# Patient Record
Sex: FEMALE | Race: WHITE | Employment: UNEMPLOYED | URBAN - NONMETROPOLITAN AREA
[De-identification: names, ages, dates, MRNs, and addresses within clinical notes are randomized per-mention and may not be internally consistent; named-entity substitution may affect disease eponyms.]

---

## 2023-05-09 ENCOUNTER — OFFICE VISIT (OUTPATIENT)
Dept: FAMILY MEDICINE CLINIC | Age: 18
End: 2023-05-09
Payer: COMMERCIAL

## 2023-05-09 VITALS
OXYGEN SATURATION: 98 % | HEIGHT: 68 IN | WEIGHT: 192 LBS | RESPIRATION RATE: 20 BRPM | HEART RATE: 84 BPM | TEMPERATURE: 98.2 F | BODY MASS INDEX: 29.1 KG/M2 | SYSTOLIC BLOOD PRESSURE: 128 MMHG | DIASTOLIC BLOOD PRESSURE: 74 MMHG

## 2023-05-09 DIAGNOSIS — A60.1 HERPES SIMPLEX INFECTION OF PERIANAL SKIN: ICD-10-CM

## 2023-05-09 DIAGNOSIS — A60.1 HERPES SIMPLEX INFECTION OF PERIANAL SKIN: Primary | ICD-10-CM

## 2023-05-09 PROCEDURE — 1036F TOBACCO NON-USER: CPT | Performed by: PHYSICIAN ASSISTANT

## 2023-05-09 PROCEDURE — G8419 CALC BMI OUT NRM PARAM NOF/U: HCPCS | Performed by: PHYSICIAN ASSISTANT

## 2023-05-09 PROCEDURE — 99203 OFFICE O/P NEW LOW 30 MIN: CPT | Performed by: PHYSICIAN ASSISTANT

## 2023-05-09 PROCEDURE — G8427 DOCREV CUR MEDS BY ELIG CLIN: HCPCS | Performed by: PHYSICIAN ASSISTANT

## 2023-05-09 RX ORDER — CEPHALEXIN 500 MG/1
CAPSULE ORAL
COMMUNITY
Start: 2023-05-06

## 2023-05-09 RX ORDER — VALACYCLOVIR HYDROCHLORIDE 1 G/1
1000 TABLET, FILM COATED ORAL 3 TIMES DAILY
Qty: 21 TABLET | Refills: 0 | Status: SHIPPED | OUTPATIENT
Start: 2023-05-09 | End: 2023-05-16

## 2023-05-09 RX ORDER — ALBUTEROL SULFATE 90 UG/1
2 AEROSOL, METERED RESPIRATORY (INHALATION) EVERY 4 HOURS PRN
COMMUNITY
Start: 2022-07-05

## 2023-05-09 ASSESSMENT — ENCOUNTER SYMPTOMS
BACK PAIN: 0
COUGH: 0
NAUSEA: 0
ABDOMINAL PAIN: 0
SHORTNESS OF BREATH: 0
DIARRHEA: 0
PHOTOPHOBIA: 0
SORE THROAT: 0
VOMITING: 0

## 2023-05-09 NOTE — PROGRESS NOTES
is in various stages of scabbing. There is no secondary infection. It does appear consistent with genital herpes. Musculoskeletal:         General: Normal range of motion. Cervical back: Normal range of motion. No rigidity. Lymphadenopathy:      Cervical: No cervical adenopathy. Skin:     General: Skin is warm and dry. Findings: Rash present. Neurological:      General: No focal deficit present. Mental Status: She is alert and oriented to person, place, and time. Mental status is at baseline. Psychiatric:         Mood and Affect: Mood normal.         Behavior: Behavior normal.         Thought Content: Thought content normal.         Judgment: Judgment normal.         Testing:           Medical Decision Making:     Patient upon arrival did not appear toxic or lethargic. Vital signs were reviewed. Past medical history reviewed. Allergies reviewed. Medications reviewed. Patient is presenting with the above complaint of rash. Differential diagnosis was discussed with the patient although her signs and symptoms are most consistent with genital herpes. Patient will be given a prescription for valacyclovir. She was educated on transmission and the need to wear condoms with future partners. I recommended close follow-up with her gynecologist.  I did obtain culture with pending results. Patient understands a plan is agreeable. Clinical Impression:   Kevin Bhatti was seen today for rash. Diagnoses and all orders for this visit:    Herpes simplex infection of perianal skin  -     HERPES SIMPLEX 1 & 2 MOLECULAR; Future    Other orders  -     valACYclovir (VALTREX) 1 g tablet; Take 1 tablet by mouth 3 times daily for 7 days        The patient is to call for any concerns or return if any of the signs or symptoms worsen. The patient is to follow-up with PCP in the next 2-3 days for repeat evaluation repeat assessment or go directly to the emergency department.      SIGNATURE:

## 2023-05-11 LAB
HSV2 DNA SPEC QL NAA+PROBE: ABNORMAL
ORGANISM: ABNORMAL

## 2024-01-11 ENCOUNTER — OFFICE VISIT (OUTPATIENT)
Dept: OBSTETRICS AND GYNECOLOGY | Facility: CLINIC | Age: 19
End: 2024-01-11
Payer: COMMERCIAL

## 2024-01-11 ENCOUNTER — LAB (OUTPATIENT)
Dept: LAB | Facility: LAB | Age: 19
End: 2024-01-11
Payer: COMMERCIAL

## 2024-01-11 VITALS
DIASTOLIC BLOOD PRESSURE: 60 MMHG | BODY MASS INDEX: 27 KG/M2 | HEIGHT: 67 IN | WEIGHT: 172 LBS | SYSTOLIC BLOOD PRESSURE: 120 MMHG

## 2024-01-11 DIAGNOSIS — N93.9 ABNORMAL UTERINE BLEEDING: ICD-10-CM

## 2024-01-11 DIAGNOSIS — N93.9 ABNORMAL UTERINE BLEEDING: Primary | ICD-10-CM

## 2024-01-11 LAB
APTT PPP: 36 SECONDS (ref 27–38)
ERYTHROCYTE [DISTWIDTH] IN BLOOD BY AUTOMATED COUNT: 11.5 % (ref 11.5–14.5)
FIBRINOGEN PPP-MCNC: 154 MG/DL (ref 200–400)
HCT VFR BLD AUTO: 38.9 % (ref 36–46)
HGB BLD-MCNC: 13.1 G/DL (ref 12–16)
INR PPP: 1.1 (ref 0.9–1.1)
MCH RBC QN AUTO: 31 PG (ref 26–34)
MCHC RBC AUTO-ENTMCNC: 33.7 G/DL (ref 32–36)
MCV RBC AUTO: 92 FL (ref 80–100)
NRBC BLD-RTO: 0 /100 WBCS (ref 0–0)
PLATELET # BLD AUTO: 283 X10*3/UL (ref 150–450)
PROTHROMBIN TIME: 12.3 SECONDS (ref 9.8–12.8)
RBC # BLD AUTO: 4.23 X10*6/UL (ref 4–5.2)
REFLEX ADDED, ANEMIA PANEL: NORMAL
WBC # BLD AUTO: 6 X10*3/UL (ref 4.4–11.3)

## 2024-01-11 PROCEDURE — 85246 CLOT FACTOR VIII VW ANTIGEN: CPT

## 2024-01-11 PROCEDURE — 36415 COLL VENOUS BLD VENIPUNCTURE: CPT

## 2024-01-11 PROCEDURE — 85240 CLOT FACTOR VIII AHG 1 STAGE: CPT

## 2024-01-11 PROCEDURE — 85027 COMPLETE CBC AUTOMATED: CPT

## 2024-01-11 PROCEDURE — 1036F TOBACCO NON-USER: CPT | Performed by: STUDENT IN AN ORGANIZED HEALTH CARE EDUCATION/TRAINING PROGRAM

## 2024-01-11 PROCEDURE — 99204 OFFICE O/P NEW MOD 45 MIN: CPT | Performed by: STUDENT IN AN ORGANIZED HEALTH CARE EDUCATION/TRAINING PROGRAM

## 2024-01-11 PROCEDURE — 85384 FIBRINOGEN ACTIVITY: CPT

## 2024-01-11 PROCEDURE — 85730 THROMBOPLASTIN TIME PARTIAL: CPT

## 2024-01-11 PROCEDURE — 85610 PROTHROMBIN TIME: CPT

## 2024-01-11 PROCEDURE — 85245 CLOT FACTOR VIII VW RISTOCTN: CPT

## 2024-01-11 RX ORDER — ALBUTEROL SULFATE 90 UG/1
2 AEROSOL, METERED RESPIRATORY (INHALATION) EVERY 4 HOURS PRN
COMMUNITY
Start: 2017-07-27 | End: 2024-01-30 | Stop reason: SDUPTHER

## 2024-01-11 RX ORDER — TRANEXAMIC ACID 650 MG/1
1300 TABLET ORAL 3 TIMES DAILY
Qty: 30 TABLET | Refills: 0 | Status: SHIPPED | OUTPATIENT
Start: 2024-01-11 | End: 2024-01-16

## 2024-01-11 RX ORDER — LEVONORGESTREL 52 MG/1
INTRAUTERINE DEVICE INTRAUTERINE ONCE
COMMUNITY

## 2024-01-11 RX ORDER — NORGESTIMATE AND ETHINYL ESTRADIOL 0.25-0.035
1 KIT ORAL DAILY
Qty: 112 TABLET | Refills: 3 | Status: SHIPPED | OUTPATIENT
Start: 2024-01-11 | End: 2025-01-10

## 2024-01-11 ASSESSMENT — PAIN SCALES - GENERAL: PAINLEVEL: 6

## 2024-01-11 NOTE — PROGRESS NOTES
Subjective   Patient ID: Che Adams is a 18 y.o. female who presents for New Patient Visit (Pt states she has been bleeding since May 2023 on Liletta ).    First period June 2016, heavy, lasting many days, Referred to gynecologist by PCP for management. Tried 3-4 different pills over a couple years with minimal control of bleeding. Had Liletta placed in June 2022, no period for 1 year. On May 1, 2023, went to ED for bleeding and abdominal pain. Has been bleeding since. Changing pad/tampon every 2 hours.    Has never been worked up for coagulopathies. Reports easy bruising of legs. History of wisdom tooth extraction, no issues. No other surgeries.     Objective   Physical Exam  Constitutional:       General: She is not in acute distress.     Appearance: Normal appearance.   HENT:      Head: Normocephalic.   Cardiovascular:      Rate and Rhythm: Normal rate.   Pulmonary:      Effort: Pulmonary effort is normal. No respiratory distress.   Skin:     General: Skin is warm and dry.   Neurological:      Mental Status: She is alert and oriented to person, place, and time.   Psychiatric:         Mood and Affect: Mood normal.         Behavior: Behavior normal.         Thought Content: Thought content normal.         Judgment: Judgment normal.     Bedside Ultrasound: Anteverted uterus with IUD positioned at the fundus.    Assessment/Plan   Problem List Items Addressed This Visit             ICD-10-CM    Abnormal uterine bleeding - Primary N93.9     - Given extensive history of heavy menstrual bleeding difficult to control since onset of menses, recommend testing for coagulopathy. Below labs ordered. If abnormal, will refer to Hematology.  - IUD in place, discussed management options. Recommend proceeding with Lysteda 1300mg TID x 5d for immediate stabilization, and continuous norgestimate-estradiol (Sprintec) for ongoing control. Patient amenable to plan of care.  - To contact clinic if still bleeding heavily in 3-4  weeks, otherwise return to clinic in 3 months for follow up assessment         Relevant Medications    tranexamic acid (Lysteda) 650 mg tablet tablet    norgestimate-ethinyl estradioL (Ortho-Cyclen) 0.25-35 mg-mcg tablet    Other Relevant Orders    CBC Anemia Panel With Reflex,Pregnancy    Coagulation Screen    Fibrinogen    Von Willebrand Antigen    von Willebrand Factor, Activity (Ristocetin Cofactor)    Factor 8 Activity            Danis Zaidi MD 01/11/24 11:03 AM

## 2024-01-12 LAB
FACT VIII ACT/NOR PPP: 112 % (ref 55–180)
VWF AG ACT/NOR PPP IA: 107 % (ref 50–220)

## 2024-01-14 LAB — VWF:RCO ACT/NOR PPP PL AGG: 124 % (ref 51–215)

## 2024-01-25 PROBLEM — M67.40 GANGLION CYST: Status: ACTIVE | Noted: 2024-01-25

## 2024-01-25 PROBLEM — R10.9 ABDOMINAL PAIN: Status: ACTIVE | Noted: 2023-05-06

## 2024-01-25 PROBLEM — F41.1 GAD (GENERALIZED ANXIETY DISORDER): Status: ACTIVE | Noted: 2022-06-30

## 2024-01-25 PROBLEM — Z86.19 HISTORY OF COLD SORES: Status: ACTIVE | Noted: 2023-05-17

## 2024-01-25 PROBLEM — Z91.410 HISTORY OF RAPE IN ADULTHOOD: Status: ACTIVE | Noted: 2022-06-30

## 2024-01-25 PROBLEM — M41.9 MILD SCOLIOSIS: Status: ACTIVE | Noted: 2024-01-25

## 2024-01-25 PROBLEM — L94.0 LOCALIZED MORPHEA: Status: ACTIVE | Noted: 2019-02-04

## 2024-01-25 PROBLEM — K64.9 HEMORRHOIDS: Status: ACTIVE | Noted: 2024-01-25

## 2024-01-25 PROBLEM — E66.3 OVERWEIGHT (BMI 25.0-29.9): Status: ACTIVE | Noted: 2021-06-11

## 2024-01-25 PROBLEM — J30.9 ALLERGIC RHINITIS: Status: ACTIVE | Noted: 2024-01-25

## 2024-01-25 PROBLEM — R53.83 FATIGUE: Status: ACTIVE | Noted: 2022-10-18

## 2024-01-25 PROBLEM — F32.A ANXIETY AND DEPRESSION: Status: ACTIVE | Noted: 2022-10-18

## 2024-01-25 PROBLEM — F34.1 PERSISTENT DEPRESSIVE DISORDER WITH ANXIOUS DISTRESS, CURRENTLY MODERATE: Status: ACTIVE | Noted: 2022-08-29

## 2024-01-25 PROBLEM — R21 RASH AND OTHER NONSPECIFIC SKIN ERUPTION: Status: ACTIVE | Noted: 2023-05-06

## 2024-01-25 PROBLEM — F41.9 ANXIETY AND DEPRESSION: Status: ACTIVE | Noted: 2022-10-18

## 2024-01-25 PROBLEM — L50.2 URTICARIA DUE TO COLD: Status: ACTIVE | Noted: 2019-06-03

## 2024-01-25 PROBLEM — J45.20 MILD INTERMITTENT ASTHMA (HHS-HCC): Status: ACTIVE | Noted: 2022-07-26

## 2024-01-25 PROBLEM — N39.0 URINARY TRACT INFECTIOUS DISEASE: Status: ACTIVE | Noted: 2024-01-25

## 2024-01-30 ENCOUNTER — OFFICE VISIT (OUTPATIENT)
Dept: PRIMARY CARE | Facility: CLINIC | Age: 19
End: 2024-01-30
Payer: COMMERCIAL

## 2024-01-30 VITALS
DIASTOLIC BLOOD PRESSURE: 60 MMHG | BODY MASS INDEX: 27 KG/M2 | SYSTOLIC BLOOD PRESSURE: 120 MMHG | WEIGHT: 172 LBS | HEIGHT: 67 IN | OXYGEN SATURATION: 98 % | HEART RATE: 70 BPM

## 2024-01-30 DIAGNOSIS — J45.20 MILD INTERMITTENT ASTHMA WITHOUT COMPLICATION (HHS-HCC): ICD-10-CM

## 2024-01-30 DIAGNOSIS — R59.0 ENLARGED LYMPH NODES IN ARMPIT: Primary | ICD-10-CM

## 2024-01-30 PROCEDURE — 1036F TOBACCO NON-USER: CPT | Performed by: NURSE PRACTITIONER

## 2024-01-30 PROCEDURE — 99204 OFFICE O/P NEW MOD 45 MIN: CPT | Performed by: NURSE PRACTITIONER

## 2024-01-30 RX ORDER — ALBUTEROL SULFATE 90 UG/1
2 AEROSOL, METERED RESPIRATORY (INHALATION) EVERY 4 HOURS PRN
Qty: 18 G | Refills: 3 | Status: SHIPPED | OUTPATIENT
Start: 2024-01-30

## 2024-01-30 ASSESSMENT — PATIENT HEALTH QUESTIONNAIRE - PHQ9
2. FEELING DOWN, DEPRESSED OR HOPELESS: NOT AT ALL
SUM OF ALL RESPONSES TO PHQ9 QUESTIONS 1 & 2: 0
1. LITTLE INTEREST OR PLEASURE IN DOING THINGS: NOT AT ALL

## 2024-01-30 NOTE — PROGRESS NOTES
"Subjective   Patient ID: Che Adams is a 18 y.o. female who presents for New pt to establish c/o armpit lumps.    HPI   Pt states that when she was 8 yrs old her mom noticed that she had a lump in her left armpit  Pediatrician evaluated     The other day her sister looked at her armpit and thought it was \"swollen\"  She denies pain to area  No recent illnesses  Mom is concerned    H/o mild intermittent asthma:   Mostly in winter  Med: Albuterol inhaler    Review of Systems    Objective   /60   Pulse 70   Ht 1.702 m (5' 7\")   Wt 78 kg (172 lb)   LMP  (LMP Unknown) Comment: IUD  SpO2 98%   BMI 26.94 kg/m²     Physical Exam    Gen.: Alert and oriented x3 female in no acute distress.  HEENT:  Neck without adenopathy   Breasts: Normal appearance, no nipple discharge. Palpation of breast normal. No palpable mass and no axillary lymphadenopathy of right.  Left axillary region is slightly more pronounced than right. No pain with palpation.  Psych: normal affect.  No suicidal ideation.  Good judgment and insight.    Reviewed Medical History form completed by patient    Assessment/Plan   New Patient    1. Enlarged lymph nodes in armpit  - BI US breast limited left; Future  She will be made aware of results.   Further orders will be determined    2. Mild intermittent asthma without complication    - albuterol (ProAir HFA) 90 mcg/actuation inhaler; Inhale 2 puffs every 4 hours if needed for shortness of breath or wheezing.  Dispense: 18 g; Refill: 3    Follow up: CPE    Medications refills will be completed as discussed.     Any labs or testing that is ordered will be reviewed and the results will be in your chart .   You can review these via  [email or web address].     Prescriptions will not be filled unless you are compliant with your follow-up appointments or have a follow-up appointment scheduled as per the instruction of your provider. Refills for medications should be requested at the time of your office visit. "     Please allow one week for refill requests to be completed.     Contact office with any questions or concerns.   Preferred communication is via  [email or web address]  Please contact [email or web address] if having issues with  MyChart    [facility]  [address], [address]  [place] [address]  Phone: [phone]    **Charting was completed using voice recognition technology and may include unintended errors**

## 2024-02-02 PROBLEM — R59.0 ENLARGED LYMPH NODES IN ARMPIT: Status: ACTIVE | Noted: 2024-02-02

## 2024-02-12 DIAGNOSIS — J35.8 TONSILLOLITH: Primary | ICD-10-CM

## 2024-02-12 RX ORDER — CLARITHROMYCIN 500 MG/1
500 TABLET, FILM COATED ORAL 2 TIMES DAILY
Qty: 20 TABLET | Refills: 0 | Status: SHIPPED | OUTPATIENT
Start: 2024-02-12 | End: 2024-04-11 | Stop reason: WASHOUT

## 2024-02-21 ENCOUNTER — OFFICE VISIT (OUTPATIENT)
Dept: OTOLARYNGOLOGY | Facility: CLINIC | Age: 19
End: 2024-02-21
Payer: COMMERCIAL

## 2024-02-21 VITALS
TEMPERATURE: 97.3 F | SYSTOLIC BLOOD PRESSURE: 117 MMHG | BODY MASS INDEX: 26.18 KG/M2 | HEIGHT: 67 IN | DIASTOLIC BLOOD PRESSURE: 74 MMHG | WEIGHT: 166.8 LBS

## 2024-02-21 DIAGNOSIS — J35.8 TONSIL STONE: ICD-10-CM

## 2024-02-21 PROCEDURE — 99203 OFFICE O/P NEW LOW 30 MIN: CPT | Performed by: OTOLARYNGOLOGY

## 2024-02-21 PROCEDURE — 1036F TOBACCO NON-USER: CPT | Performed by: OTOLARYNGOLOGY

## 2024-02-21 NOTE — PROGRESS NOTES
Impression:  1. Tonsil stone  Referral to ENT            RECOMMENDATIONS/PLAN :  I reassured the patient there is no evidence of any active tonsil stones and her tonsils are back down to normal size.  She will continue to drink plenty of water and do some salt water gargles after she eats to avoid any future tonsil stones.  At this point I do not recommend any surgical intervention.  She will follow-up with her family physician if she comes down with acute tonsillitis in the future.      **This electronic medical record note was created with the use of voice recognition software.  Despite proofreading, typographical or grammatical errors may be present that could affect meaning of content **    Subjective   Patient ID:     Che Adams is a 18 y.o. female who presents to the office today after she previously had tonsil stones.  She also states that her tonsils were swollen and she did finish antibiotics and she is doing much better now.  She denies having 7 infections in 1 year or 5 each year for 2 years in a row.  She denies any obstructive issues.  She does complain that her nose is somewhat dry and occasionally she does have difficulty breathing through her nose.  No history of sinus infections fever or chills.    ROS:  A detailed 12 system review of systems is noted on the intake form has been reviewed with the patient with details noted in the HPI and scanned into the patient's medical record.    Objective     Past Medical History:   Diagnosis Date    Achilles tendinitis, unspecified leg 06/16/2015    Achilles tendonitis    Acute pharyngitis, unspecified 12/17/2016    Sore throat    Acute upper respiratory infection, unspecified 12/01/2015    Acute URI    Allergic     Anxiety     Asthma     Encounter for immunization 12/30/2014    Need for HPV vaccine    GERD (gastroesophageal reflux disease)     Myringotomy tube(s) status     Patent pressure equalization (PE) tubes, bilateral    Other disorders affecting  eyelid function     Excessive blinking    Otitis media, unspecified, bilateral     BOM (bilateral otitis media)    Otitis media, unspecified, right ear 05/10/2017    Right acute otitis media    Personal history of other diseases of the nervous system and sense organs     Personal history of otitis media    Personal history of other diseases of the respiratory system     History of bronchiolitis    Personal history of other diseases of the respiratory system     History of upper respiratory infection    Personal history of other diseases of the respiratory system 11/08/2014    History of streptococcal pharyngitis    Personal history of other diseases of the respiratory system 11/08/2014    History of pharyngitis    Personal history of other diseases of the respiratory system 12/19/2016    History of streptococcal pharyngitis    Personal history of other specified conditions     History of wheezing        Past Surgical History:   Procedure Laterality Date    ADENOIDECTOMY      TYMPANOSTOMY TUBE PLACEMENT  09/02/2014    Ear Pressure Equalization Tube, Insertion, Bilaterally    WISDOM TOOTH EXTRACTION          Allergies   Allergen Reactions    Kiwi Hives    Penicillins Hives          Current Outpatient Medications:     cetirizine (ZyrTEC) 10 mg capsule, Take by mouth early in the morning.., Disp: , Rfl:     levonorgestreL (Liletta) 20.4 mcg/24 hrs (8 yrs) 52 mg intrauterine device, by intrauterine route 1 time., Disp: , Rfl:     norgestimate-ethinyl estradioL (Ortho-Cyclen) 0.25-35 mg-mcg tablet, Take 1 tablet by mouth once daily. Skip placebo week, take active pill continuously., Disp: 112 tablet, Rfl: 3    albuterol (ProAir HFA) 90 mcg/actuation inhaler, Inhale 2 puffs every 4 hours if needed for shortness of breath or wheezing., Disp: 18 g, Rfl: 3    clarithromycin (Biaxin) 500 mg tablet, Take 1 tablet (500 mg) by mouth 2 times a day., Disp: 20 tablet, Rfl: 0     Tobacco Use: Low Risk  (2/21/2024)    Patient History  "    Smoking Tobacco Use: Never     Smokeless Tobacco Use: Never     Passive Exposure: Not on file        Alcohol Use: Not on file        Social History     Substance and Sexual Activity   Drug Use Never        Physical Exam:  Visit Vitals  /74   Temp 36.3 °C (97.3 °F) (Temporal)   Ht 1.702 m (5' 7\")   Wt 75.7 kg (166 lb 12.8 oz)   BMI 26.12 kg/m²   OB Status Having periods   Smoking Status Never   BSA 1.89 m²      General: Patient is alert, oriented, cooperative in no apparent distress.  Head: Normocephalic, atraumatic.  Eyes: PERRL, EOMI, Conjunctiva is clear. No nystagmus.  Ears: Right Ear-- Pinna is normal.  External auditory canal is patent. Tympanic membrane is [intact, translucent and has good mobility with my pneumatic otoscope. No effusion].  Mastoid is nontender.  Left ear-- Pinna is normal.  External auditory canal is patent. Tympanic membrane is [intact, translucent and has good mobility with my pneumatic otoscope.  No effusion].  Mastoid is nontender.  Nose: Septum is mildly deviated to the right.  No septal perforation or lesions. No septal hematoma/ seroma.  No signs of bleeding.  Inferior turbinates are mildly swollen.   No evidence of intranasal polyps.  No infectious drainage.  Throat:  Floor of mouth is clear, no masses.  Tongue appears normal, no lesions or masses. Gums, gingiva, buccal mucosa appear pink and moist, no lesions. Teeth are in good repair.  No obvious dental infections.  Peritonsillar regions appear symmetric without swelling.  Hard and soft palate appear normal, no obvious cleft. Uvula is midline.  Left Tonsil --+1, no exudates.  No active tonsil stones  Right Tonsil --+1, no exudates.  No active tonsil stones  Oropharynx: No lesions. Retropharyngeal wall is flat.  No postnasal drip.  Neck: Supple,  no lymphadenopathy.  No masses.  Salivary Glands: Symmetric bilaterally.  No palpable masses.  No evidence of acute infection or salivary stones  Neurologic: Cranial Nerves 2-12 " are grossly intact without focal deficits. Cerebellar function testing is normal.     Results:   []    Procedure:   []    Taye Goldberg, DO

## 2024-03-12 ENCOUNTER — APPOINTMENT (OUTPATIENT)
Dept: OTOLARYNGOLOGY | Facility: CLINIC | Age: 19
End: 2024-03-12
Payer: COMMERCIAL

## 2024-04-11 ENCOUNTER — OFFICE VISIT (OUTPATIENT)
Dept: OBSTETRICS AND GYNECOLOGY | Facility: CLINIC | Age: 19
End: 2024-04-11
Payer: COMMERCIAL

## 2024-04-11 VITALS
HEIGHT: 67 IN | SYSTOLIC BLOOD PRESSURE: 102 MMHG | WEIGHT: 166 LBS | BODY MASS INDEX: 26.06 KG/M2 | DIASTOLIC BLOOD PRESSURE: 64 MMHG

## 2024-04-11 DIAGNOSIS — N93.9 ABNORMAL UTERINE BLEEDING: Primary | ICD-10-CM

## 2024-04-11 PROCEDURE — 99213 OFFICE O/P EST LOW 20 MIN: CPT | Performed by: STUDENT IN AN ORGANIZED HEALTH CARE EDUCATION/TRAINING PROGRAM

## 2024-04-11 PROCEDURE — 1036F TOBACCO NON-USER: CPT | Performed by: STUDENT IN AN ORGANIZED HEALTH CARE EDUCATION/TRAINING PROGRAM

## 2024-04-11 ASSESSMENT — PAIN SCALES - GENERAL: PAINLEVEL: 0-NO PAIN

## 2024-04-11 NOTE — PROGRESS NOTES
Subjective   Patient ID: Che Adams is a 19 y.o. female who presents for Follow-up (NO Bleeding simnce 3/3/2024).    Reports no vaginal bleeding since March 3, very happy with results. Reports 35lb weight loss since October without significant changes in habits; this is not bothersome to her. No other questions or concerns.     Objective   Physical Exam  Constitutional:       General: She is not in acute distress.     Appearance: Normal appearance.   HENT:      Head: Normocephalic.   Cardiovascular:      Rate and Rhythm: Normal rate.   Pulmonary:      Effort: Pulmonary effort is normal. No respiratory distress.   Skin:     General: Skin is warm and dry.   Neurological:      Mental Status: She is alert and oriented to person, place, and time.   Psychiatric:         Mood and Affect: Mood normal.         Behavior: Behavior normal.         Thought Content: Thought content normal.         Judgment: Judgment normal.         Assessment/Plan   Problem List Items Addressed This Visit             ICD-10-CM    Abnormal uterine bleeding - Primary N93.9     Reviewed coagulopathy labs - nothing requiring follow up  Currently no bleeding, pleased with results  Follow up 1 year or sooner PRN                 Danis Zaidi MD 04/11/24 8:47 AM

## 2024-04-11 NOTE — ASSESSMENT & PLAN NOTE
Reviewed coagulopathy labs - nothing requiring follow up  Currently no bleeding, pleased with results  Follow up 1 year or sooner PRN

## 2024-09-06 ENCOUNTER — PATIENT MESSAGE (OUTPATIENT)
Dept: PRIMARY CARE | Facility: CLINIC | Age: 19
End: 2024-09-06
Payer: COMMERCIAL

## 2024-09-06 DIAGNOSIS — R11.2 NAUSEA AND VOMITING, UNSPECIFIED VOMITING TYPE: Primary | ICD-10-CM

## 2024-09-09 DIAGNOSIS — N93.9 ABNORMAL UTERINE BLEEDING: ICD-10-CM

## 2024-09-10 RX ORDER — NORGESTIMATE AND ETHINYL ESTRADIOL 0.25-0.035
1 KIT ORAL DAILY
Qty: 28 TABLET | Refills: 0 | Status: SHIPPED | OUTPATIENT
Start: 2024-09-10 | End: 2025-09-10

## 2024-10-31 ENCOUNTER — OFFICE VISIT (OUTPATIENT)
Dept: GASTROENTEROLOGY | Facility: CLINIC | Age: 19
End: 2024-10-31
Payer: COMMERCIAL

## 2024-10-31 VITALS
WEIGHT: 165 LBS | HEART RATE: 80 BPM | BODY MASS INDEX: 25.01 KG/M2 | TEMPERATURE: 98.4 F | SYSTOLIC BLOOD PRESSURE: 120 MMHG | DIASTOLIC BLOOD PRESSURE: 76 MMHG | HEIGHT: 68 IN

## 2024-10-31 DIAGNOSIS — R13.19 ESOPHAGEAL DYSPHAGIA: Primary | ICD-10-CM

## 2024-10-31 DIAGNOSIS — K59.09 CHRONIC CONSTIPATION: ICD-10-CM

## 2024-10-31 DIAGNOSIS — R11.2 NAUSEA AND VOMITING, UNSPECIFIED VOMITING TYPE: ICD-10-CM

## 2024-10-31 PROCEDURE — 99203 OFFICE O/P NEW LOW 30 MIN: CPT | Performed by: NURSE PRACTITIONER

## 2024-10-31 PROCEDURE — 99213 OFFICE O/P EST LOW 20 MIN: CPT | Performed by: NURSE PRACTITIONER

## 2024-10-31 PROCEDURE — 1036F TOBACCO NON-USER: CPT | Performed by: NURSE PRACTITIONER

## 2024-10-31 PROCEDURE — 3008F BODY MASS INDEX DOCD: CPT | Performed by: NURSE PRACTITIONER

## 2024-10-31 RX ORDER — OMEPRAZOLE 40 MG/1
40 CAPSULE, DELAYED RELEASE ORAL DAILY
Qty: 30 CAPSULE | Refills: 11 | Status: SHIPPED | OUTPATIENT
Start: 2024-10-31 | End: 2025-10-31

## 2024-10-31 RX ORDER — WHEAT DEXTRIN 5 G/7.4 G
POWDER (GRAM) ORAL
Qty: 248 G | Refills: 1 | Status: SHIPPED | OUTPATIENT
Start: 2024-10-31

## 2024-10-31 ASSESSMENT — ENCOUNTER SYMPTOMS
FEVER: 0
ROS GI COMMENTS: SEE HPI
HEMATOLOGIC/LYMPHATIC NEGATIVE: 1
WHEEZING: 0
EYES NEGATIVE: 1
STRIDOR: 0
RESPIRATORY NEGATIVE: 1
ALLERGIC/IMMUNOLOGIC NEGATIVE: 1
NEUROLOGICAL NEGATIVE: 1
CARDIOVASCULAR NEGATIVE: 1
MUSCULOSKELETAL NEGATIVE: 1
CHILLS: 0
FATIGUE: 0
DIAPHORESIS: 0
ENDOCRINE NEGATIVE: 1
DIFFICULTY URINATING: 0
SHORTNESS OF BREATH: 0
PSYCHIATRIC NEGATIVE: 1
COUGH: 0
CHEST TIGHTNESS: 0
APNEA: 0

## 2024-11-07 ENCOUNTER — APPOINTMENT (OUTPATIENT)
Dept: PRIMARY CARE | Facility: CLINIC | Age: 19
End: 2024-11-07
Payer: COMMERCIAL

## 2024-11-11 ENCOUNTER — APPOINTMENT (OUTPATIENT)
Dept: PRIMARY CARE | Facility: CLINIC | Age: 19
End: 2024-11-11
Payer: COMMERCIAL

## 2024-11-11 ENCOUNTER — LAB (OUTPATIENT)
Dept: LAB | Facility: LAB | Age: 19
End: 2024-11-11
Payer: COMMERCIAL

## 2024-11-11 VITALS
BODY MASS INDEX: 25.9 KG/M2 | WEIGHT: 165 LBS | HEART RATE: 96 BPM | DIASTOLIC BLOOD PRESSURE: 62 MMHG | SYSTOLIC BLOOD PRESSURE: 110 MMHG | OXYGEN SATURATION: 100 % | HEIGHT: 67 IN

## 2024-11-11 DIAGNOSIS — J32.9 BACTERIAL SINUSITIS: ICD-10-CM

## 2024-11-11 DIAGNOSIS — Z00.00 HEALTHCARE MAINTENANCE: ICD-10-CM

## 2024-11-11 DIAGNOSIS — R13.19 ESOPHAGEAL DYSPHAGIA: ICD-10-CM

## 2024-11-11 DIAGNOSIS — B96.89 BACTERIAL SINUSITIS: ICD-10-CM

## 2024-11-11 DIAGNOSIS — E55.9 VITAMIN D DEFICIENCY: ICD-10-CM

## 2024-11-11 DIAGNOSIS — Z23 NEED FOR INFLUENZA VACCINATION: ICD-10-CM

## 2024-11-11 DIAGNOSIS — J45.20 MILD INTERMITTENT ASTHMA WITHOUT COMPLICATION (HHS-HCC): ICD-10-CM

## 2024-11-11 DIAGNOSIS — J30.89 ENVIRONMENTAL AND SEASONAL ALLERGIES: ICD-10-CM

## 2024-11-11 DIAGNOSIS — Z00.00 HEALTHCARE MAINTENANCE: Primary | ICD-10-CM

## 2024-11-11 PROBLEM — Z91.410 HISTORY OF RAPE IN ADULTHOOD: Status: RESOLVED | Noted: 2022-06-30 | Resolved: 2024-11-11

## 2024-11-11 PROBLEM — F34.1 PERSISTENT DEPRESSIVE DISORDER WITH ANXIOUS DISTRESS, CURRENTLY MODERATE: Status: RESOLVED | Noted: 2022-08-29 | Resolved: 2024-11-11

## 2024-11-11 PROBLEM — F32.A ANXIETY AND DEPRESSION: Status: RESOLVED | Noted: 2022-10-18 | Resolved: 2024-11-11

## 2024-11-11 PROBLEM — F41.9 ANXIETY AND DEPRESSION: Status: RESOLVED | Noted: 2022-10-18 | Resolved: 2024-11-11

## 2024-11-11 PROBLEM — N39.0 URINARY TRACT INFECTIOUS DISEASE: Status: RESOLVED | Noted: 2024-01-25 | Resolved: 2024-11-11

## 2024-11-11 PROBLEM — F41.1 GAD (GENERALIZED ANXIETY DISORDER): Status: RESOLVED | Noted: 2022-06-30 | Resolved: 2024-11-11

## 2024-11-11 PROBLEM — R59.0 ENLARGED LYMPH NODES IN ARMPIT: Status: RESOLVED | Noted: 2024-02-02 | Resolved: 2024-11-11

## 2024-11-11 PROBLEM — L50.2 URTICARIA DUE TO COLD: Status: RESOLVED | Noted: 2019-06-03 | Resolved: 2024-11-11

## 2024-11-11 PROCEDURE — 3008F BODY MASS INDEX DOCD: CPT | Performed by: NURSE PRACTITIONER

## 2024-11-11 PROCEDURE — 81003 URINALYSIS AUTO W/O SCOPE: CPT

## 2024-11-11 PROCEDURE — 90471 IMMUNIZATION ADMIN: CPT | Performed by: NURSE PRACTITIONER

## 2024-11-11 PROCEDURE — 90656 IIV3 VACC NO PRSV 0.5 ML IM: CPT | Performed by: NURSE PRACTITIONER

## 2024-11-11 PROCEDURE — 36415 COLL VENOUS BLD VENIPUNCTURE: CPT

## 2024-11-11 PROCEDURE — 85027 COMPLETE CBC AUTOMATED: CPT

## 2024-11-11 PROCEDURE — 99395 PREV VISIT EST AGE 18-39: CPT | Performed by: NURSE PRACTITIONER

## 2024-11-11 PROCEDURE — 84443 ASSAY THYROID STIM HORMONE: CPT

## 2024-11-11 PROCEDURE — 82306 VITAMIN D 25 HYDROXY: CPT

## 2024-11-11 PROCEDURE — 1036F TOBACCO NON-USER: CPT | Performed by: NURSE PRACTITIONER

## 2024-11-11 PROCEDURE — 80061 LIPID PANEL: CPT

## 2024-11-11 PROCEDURE — 80053 COMPREHEN METABOLIC PANEL: CPT

## 2024-11-11 RX ORDER — ALBUTEROL SULFATE 90 UG/1
2 INHALANT RESPIRATORY (INHALATION) EVERY 4 HOURS PRN
Qty: 18 G | Refills: 10 | Status: SHIPPED | OUTPATIENT
Start: 2024-11-11

## 2024-11-11 RX ORDER — CETIRIZINE HYDROCHLORIDE 10 MG/1
1 TABLET ORAL
COMMUNITY
Start: 2024-09-25

## 2024-11-11 RX ORDER — CLARITHROMYCIN 500 MG/1
500 TABLET, FILM COATED ORAL 2 TIMES DAILY
Qty: 14 TABLET | Refills: 0 | Status: SHIPPED | OUTPATIENT
Start: 2024-11-11

## 2024-11-11 ASSESSMENT — PATIENT HEALTH QUESTIONNAIRE - PHQ9
SUM OF ALL RESPONSES TO PHQ9 QUESTIONS 1 & 2: 0
1. LITTLE INTEREST OR PLEASURE IN DOING THINGS: NOT AT ALL
2. FEELING DOWN, DEPRESSED OR HOPELESS: NOT AT ALL

## 2024-11-11 ASSESSMENT — PROMIS GLOBAL HEALTH SCALE
RATE_AVERAGE_PAIN: 3
RATE_AVERAGE_FATIGUE: MODERATE
RATE_GENERAL_HEALTH: EXCELLENT
RATE_PHYSICAL_HEALTH: EXCELLENT
RATE_SOCIAL_SATISFACTION: EXCELLENT
EMOTIONAL_PROBLEMS: SOMETIMES
CARRYOUT_SOCIAL_ACTIVITIES: EXCELLENT
CARRYOUT_PHYSICAL_ACTIVITIES: COMPLETELY
RATE_MENTAL_HEALTH: EXCELLENT
RATE_QUALITY_OF_LIFE: EXCELLENT

## 2024-11-11 NOTE — PROGRESS NOTES
Annual Comprehensive Medical Exam:    19 y.o. female presents for annual comprehensive medical evaluation and preventive services screening.      Recent hospitalizations, surgeries or ER visits: denies     Diet:      mix of healthy and unhealthy  Caffeine per week: 10   Water per day:  Lots   Exercise: 5 days a week   Alcohol: Bottle a wine a week   Tobacco:   Vaping: quit 10/26/24  Pap/Pelvic: Dr Danis Zaidi -OCP    Allowed to report any questions or concerns.    Esophageal Dysphagia: GI Knedy Valderrama CNP  Med: Omeprazole   EGD 12/17/24 GI Dr Basil Spence    Tonsil Stones: ENT Dr Taye Goldberg    Asthma:   Med: Albuterol     Sinus congestion and left ear pain  Since Friday   Mucinex DM   Tylenol     Past Medical History:   Diagnosis Date    Achilles tendinitis, unspecified leg 06/16/2015    Achilles tendonitis    Acute pharyngitis, unspecified 12/17/2016    Sore throat    Acute upper respiratory infection, unspecified 12/01/2015    Acute URI    Allergic     Anxiety     Asthma     Encounter for immunization 12/30/2014    Need for HPV vaccine    GERD (gastroesophageal reflux disease)     Myringotomy tube(s) status     Patent pressure equalization (PE) tubes, bilateral    Other disorders affecting eyelid function     Excessive blinking    Otitis media, unspecified, bilateral     BOM (bilateral otitis media)    Otitis media, unspecified, right ear 05/10/2017    Right acute otitis media    Personal history of other diseases of the nervous system and sense organs     Personal history of otitis media    Personal history of other diseases of the respiratory system     History of bronchiolitis    Personal history of other diseases of the respiratory system     History of upper respiratory infection    Personal history of other diseases of the respiratory system 11/08/2014    History of streptococcal pharyngitis    Personal history of other diseases of the respiratory system 11/08/2014    History of pharyngitis     Personal history of other diseases of the respiratory system 12/19/2016    History of streptococcal pharyngitis    Personal history of other specified conditions     History of wheezing      Past Surgical History:   Procedure Laterality Date    ADENOIDECTOMY      TYMPANOSTOMY TUBE PLACEMENT  09/02/2014    Ear Pressure Equalization Tube, Insertion, Bilaterally    WISDOM TOOTH EXTRACTION       Family History   Problem Relation Name Age of Onset    Diabetes Mother Galilea Mejiaskmiller     Hypertension Mother Galilea Mejiaskmiller     Asthma Father Danis Ndiayeilleclinton     Asthma Sister Sarah     Asthma Brother Max     Diabetes Mother's Sister yMla Hernandez     Pancreatic cancer Maternal Grandmother Esthela Smincharajesh     Diabetes Maternal Grandmother Esthela Gaoharajesh     Diabetes Maternal Grandfather Loco Sultananctyler     Heart disease Maternal Grandfather Loco Malave     Hypertension Maternal Grandfather Loco Malave     Other (esophageal cancer) Paternal Grandmother Lucia Adams       Social History     Socioeconomic History    Marital status: Single     Spouse name: Not on file    Number of children: Not on file    Years of education: Not on file    Highest education level: Not on file   Occupational History    Occupation: Assistant    Tobacco Use    Smoking status: Former     Types: Cigarettes    Smokeless tobacco: Never    Tobacco comments:     vape   Vaping Use    Vaping status: Never Used   Substance and Sexual Activity    Alcohol use: Yes     Comment: social    Drug use: Never    Sexual activity: Yes     Partners: Male     Birth control/protection: I.U.D.     Comment: IUD and pill   Other Topics Concern    Not on file   Social History Narrative    Not on file     Social Drivers of Health     Financial Resource Strain: Low Risk  (10/2/2023)    [facility]    Overall Financial Resource Strain (CARDIA)     Difficulty of Paying Living Expenses: Not hard at all   Food Insecurity: No  Food Insecurity (10/2/2023)    Received from [facility]    Hunger Vital Sign     Worried About Running Out of Food in the Last Year: Never true     Ran Out of Food in the Last Year: Never true   Transportation Needs: No Transportation Needs (10/2/2023)    Received from [facility]    PRAPARE - Transportation     Lack of Transportation (Medical): No     Lack of Transportation (Non-Medical): No   Physical Activity: Sufficiently Active (10/2/2023)    Received from [facility]    Exercise Vital Sign     Days of Exercise per Week: 5 days     Minutes of Exercise per Session: 60 min   Stress: Stress Concern Present (10/2/2023)    Received from [facility]    [employer] [place] [employer] - Occupational Stress Questionnaire     Feeling of Stress : Rather much   Social Connections: Moderately Isolated (10/2/2023)    Received from [facility]    Social Connection and Isolation Panel [NHANES]     Frequency of Communication with Friends and Family: More than three times a week     Frequency of Social Gatherings with Friends and Family: More than three times a week     Attends Oriental orthodox Services: 1 to 4 times per year     Active Member of Clubs or Organizations: No     Attends Club or Organization Meetings: Never     Marital Status: Never    Intimate Partner Violence: Unknown (10/2/2023)    Received from [facility]    Humiliation, Afraid, Rape, and Kick questionnaire     Fear of Current or Ex-Partner: No     Emotionally Abused: No     Physically Abused: No     Sexually Abused: Patient declined   Housing Stability: Low Risk  (10/2/2023)    Received from [facility]    Housing Stability Vital Sign     Unable to Pay for Housing in the Last Year: No     Number of Places Lived in the Last Year: 2     Unstable Housing in the Last Year: No       Current Outpatient Medications on File Prior to Visit   Medication Sig Dispense  "Refill    albuterol (ProAir HFA) 90 mcg/actuation inhaler Inhale 2 puffs every 4 hours if needed for shortness of breath or wheezing. 18 g 3    cetirizine (ZyrTEC) 10 mg capsule Take by mouth early in the morning..      levonorgestreL (Liletta) 20.4 mcg/24 hrs (8 yrs) 52 mg intrauterine device by intrauterine route 1 time. In 2022  Out 2030      norgestimate-ethinyl estradioL (Ortho-Cyclen) 0.25-35 mg-mcg tablet Take 1 tablet by mouth once daily. Skip placebo week, take active pill continuously. 28 tablet 0    omeprazole (PriLOSEC) 40 mg DR capsule Take 1 capsule (40 mg) by mouth once daily. Do not crush or chew. 30 capsule 11    wheat dextrin (Benefiber Healthy Shape) 5 gram/7.4 gram powder Take 1 teaspoon daily 248 g 1     No current facility-administered medications on file prior to visit.       Allergies   Allergen Reactions    Kiwi Hives    Penicillins Hives         Visit Vitals  /62   Pulse 96   Ht 1.702 m (5' 7\")   Wt 74.8 kg (165 lb)   SpO2 100%   BMI 25.84 kg/m²   OB Status IUD   Smoking Status Former   BSA 1.88 m²        Physical Exam  Gen: Alert and oriented x3 female in no acute distress.  HEENT: Head is normocephalic.  Neck is supple without carotid bruits. Otoscope exam completed. Right ear normal. Left ear canal erythematous and mildly edematous  Heart: Regular rate and rhythm without murmurs.  Lungs: Clear to auscultation bilaterally.  Breast:          Deferred to Gyn  Pelvic:          Deferred to Gyn   Abdomen: Soft with normal bowel sounds.  RUQ slightly tender with palpation   Extremities: Good range of motion of all joints.  No significant edema. Pedal pulses +1-2/4  Neuro: No signs of focal neurologic deficit.  No tremor.  Speech and hearing are normal.  DTRs +3/4;  Muscle Strength +5/5.  Musculoskeletal: Spine with good ROM.  Leg lengths are equal.  Skin: No significant or irregular nevi visualized.  Psych: normal affect.  No suicidal ideation.  Good judgment and " insight.    Diagnosis/Plan:     1. Healthcare maintenance (Primary)    - Comprehensive metabolic panel; Future  - CBC; Future  - Lipid panel; Future  - TSH with reflex to Free T4 if abnormal; Future  - Urinalysis with Reflex Microscopic; Future    2. Vitamin D deficiency  Vitamin D lab ordered. If your level is low,  a script for a weekly dose of Vitamin D will be sent to pharmacy. You should start or continue a Multivitamin.    - Vitamin D 25-Hydroxy,Total (for eval of Vitamin D levels); Future    3. Need for influenza vaccination    - Flu vaccine, trivalent, preservative free, age 6 months and greater (Fluarix/Fluzone/Flulaval)    4. Bacterial sinusitis    Complete prescribed antibiotics as directed. Eat yogurt or take a probiotic (not within the hour of taking the antibiotic) while taking antibiotics.   Increase fluid intake throughout the day.    Irrigate your nose with saline spray 2-4 times per day.   Antihistamine nasal sprays (like Azelastine) also help with nasal congestion and sinus infection. Side effects of Azelastine include an occasional bitter taste. You can reduce the bitter taste by leaning forward, directing the spray toward the outside of your nose, and not sniffing for a few minutes.    Mucinex  DM for cough  Contact the office if not improving after completing the antibiotics.     Aware birth control is ineffective while taking antibiotics.     - clarithromycin (Biaxin) 500 mg tablet; Take 1 tablet (500 mg) by mouth 2 times a day.  Dispense: 14 tablet; Refill: 0    5. Mild intermittent asthma without complication ([facility])    - albuterol (ProAir HFA) 90 mcg/actuation inhaler; Inhale 2 puffs every 4 hours if needed for shortness of breath or wheezing.  Dispense: 18 g; Refill: 10    6. Environmental and seasonal allergies  Stable.  Continue current medications.  Suggested Azelastine spray     7. Esophageal dysphagia  Follow-up with specialist per their discretion/direction.       Medications  refills will be completed as discussed.     Any labs or testing that is ordered will be reviewed and the results will be in your chart .   You can review these via  [employer].     Follow up 1 year for CPE     Prescriptions will not be filled unless you are compliant with your follow-up appointments or have a follow-up appointment scheduled as per the instruction of your provider. Refills for medications should be requested at the time of your office visit.     Please allow one week for refill requests to be completed.     [facility] can help with scheduling referrals: [phone]   Mammogram [phone]  Physical Therapy [phone]    Contact office with any questions or concerns.   Preferred communication is via  [employer]  Please contact [email or web address] if having issues with  [employer]    Federica [facility]  [address], [address]  [place] [address]  Phone: [phone]    **Charting was completed using voice recognition technology and may include unintended errors**

## 2024-11-12 ENCOUNTER — APPOINTMENT (OUTPATIENT)
Dept: GASTROENTEROLOGY | Facility: CLINIC | Age: 19
End: 2024-11-12
Payer: COMMERCIAL

## 2024-11-12 LAB
25(OH)D3 SERPL-MCNC: 42 NG/ML (ref 30–100)
ALBUMIN SERPL BCP-MCNC: 3.8 G/DL (ref 3.4–5)
ALP SERPL-CCNC: 60 U/L (ref 33–110)
ALT SERPL W P-5'-P-CCNC: 12 U/L (ref 7–45)
ANION GAP SERPL CALC-SCNC: 12 MMOL/L (ref 10–20)
APPEARANCE UR: CLEAR
AST SERPL W P-5'-P-CCNC: 13 U/L (ref 9–39)
BILIRUB SERPL-MCNC: 0.3 MG/DL (ref 0–1.2)
BILIRUB UR STRIP.AUTO-MCNC: NEGATIVE MG/DL
BUN SERPL-MCNC: 14 MG/DL (ref 6–23)
CALCIUM SERPL-MCNC: 8.9 MG/DL (ref 8.6–10.6)
CHLORIDE SERPL-SCNC: 103 MMOL/L (ref 98–107)
CHOLEST SERPL-MCNC: 172 MG/DL (ref 0–199)
CHOLESTEROL/HDL RATIO: 3.7
CO2 SERPL-SCNC: 28 MMOL/L (ref 21–32)
COLOR UR: YELLOW
CREAT SERPL-MCNC: 0.62 MG/DL (ref 0.5–1.05)
EGFRCR SERPLBLD CKD-EPI 2021: >90 ML/MIN/1.73M*2
ERYTHROCYTE [DISTWIDTH] IN BLOOD BY AUTOMATED COUNT: 11.8 % (ref 11.5–14.5)
GLUCOSE SERPL-MCNC: 89 MG/DL (ref 74–99)
GLUCOSE UR STRIP.AUTO-MCNC: NORMAL MG/DL
HCT VFR BLD AUTO: 40.5 % (ref 36–46)
HDLC SERPL-MCNC: 46.3 MG/DL
HGB BLD-MCNC: 13.1 G/DL (ref 12–16)
KETONES UR STRIP.AUTO-MCNC: NEGATIVE MG/DL
LDLC SERPL CALC-MCNC: 98 MG/DL
LEUKOCYTE ESTERASE UR QL STRIP.AUTO: NEGATIVE
MCH RBC QN AUTO: 29.8 PG (ref 26–34)
MCHC RBC AUTO-ENTMCNC: 32.3 G/DL (ref 32–36)
MCV RBC AUTO: 92 FL (ref 80–100)
NITRITE UR QL STRIP.AUTO: NEGATIVE
NON HDL CHOLESTEROL: 126 MG/DL (ref 0–149)
NRBC BLD-RTO: 0 /100 WBCS (ref 0–0)
PH UR STRIP.AUTO: 6 [PH]
PLATELET # BLD AUTO: 342 X10*3/UL (ref 150–450)
POTASSIUM SERPL-SCNC: 4.6 MMOL/L (ref 3.5–5.3)
PROT SERPL-MCNC: 6.7 G/DL (ref 6.4–8.2)
PROT UR STRIP.AUTO-MCNC: NEGATIVE MG/DL
RBC # BLD AUTO: 4.4 X10*6/UL (ref 4–5.2)
RBC # UR STRIP.AUTO: NEGATIVE /UL
SODIUM SERPL-SCNC: 138 MMOL/L (ref 136–145)
SP GR UR STRIP.AUTO: 1.02
TRIGL SERPL-MCNC: 140 MG/DL (ref 0–114)
TSH SERPL-ACNC: 2.95 MIU/L (ref 0.44–3.98)
UROBILINOGEN UR STRIP.AUTO-MCNC: NORMAL MG/DL
VLDL: 28 MG/DL (ref 0–40)
WBC # BLD AUTO: 5.6 X10*3/UL (ref 4.4–11.3)

## 2024-12-03 ENCOUNTER — OFFICE VISIT (OUTPATIENT)
Dept: GASTROENTEROLOGY | Facility: CLINIC | Age: 19
End: 2024-12-03
Payer: COMMERCIAL

## 2024-12-03 DIAGNOSIS — R13.19 ESOPHAGEAL DYSPHAGIA: Primary | ICD-10-CM

## 2024-12-03 PROCEDURE — 99213 OFFICE O/P EST LOW 20 MIN: CPT | Performed by: NURSE PRACTITIONER

## 2024-12-03 PROCEDURE — 99213 OFFICE O/P EST LOW 20 MIN: CPT | Mod: 95 | Performed by: NURSE PRACTITIONER

## 2024-12-03 RX ORDER — FAMOTIDINE 40 MG/1
40 TABLET, FILM COATED ORAL DAILY
Qty: 30 TABLET | Refills: 11 | Status: SHIPPED | OUTPATIENT
Start: 2024-12-03 | End: 2025-12-03

## 2024-12-03 ASSESSMENT — ENCOUNTER SYMPTOMS
DIAPHORESIS: 0
PSYCHIATRIC NEGATIVE: 1
APNEA: 0
COUGH: 0
RESPIRATORY NEGATIVE: 1
CHEST TIGHTNESS: 0
EYES NEGATIVE: 1
NEUROLOGICAL NEGATIVE: 1
ALLERGIC/IMMUNOLOGIC NEGATIVE: 1
ENDOCRINE NEGATIVE: 1
HEMATOLOGIC/LYMPHATIC NEGATIVE: 1
STRIDOR: 0
FEVER: 0
WHEEZING: 0
CARDIOVASCULAR NEGATIVE: 1
FATIGUE: 0
ROS GI COMMENTS: SEE HPI
MUSCULOSKELETAL NEGATIVE: 1
CHILLS: 0
SHORTNESS OF BREATH: 0
DIFFICULTY URINATING: 0

## 2024-12-03 NOTE — PROGRESS NOTES
Subjective   Patient ID: Che Adams is a 19 y.o. female who presents for Follow-up. PMH: asthma     Did not complete EGD d/t scheduling   She has been doing well with vomiting, she tried Omeprazole and this made her reflux worse  Has not tried H2 blocker in the past  She continues to have dysphagia, this is localized to sternal area and alleviated with water  Started in December after a viral infection, she is vomiting (has improved) and now will vomit every few months  Was 1-2 times/month. When she vomits stomach acid or certain foods.    She reports constipation has improved and no longer having cramping.     She denies rectal bleeding, nocturnal Bm's, she denies fevers, +40 lb weight loss since December , she had a lot of stressors. She reports dysphagia, localized to esophageal area. This occurs with solid foods only and alleviated with water.    She is lactose intolerance but does not avoid dairy     Family Hx:   M. Grandmother with pancreatic cancer  P. Grandmother with  esophageal cancer  Mother has contorted colon  H/o colon polyps on moms side  M. Grandfather with diverticulitis    Review of Systems   Constitutional:  Negative for chills, diaphoresis, fatigue and fever.   HENT: Negative.     Eyes: Negative.    Respiratory: Negative.  Negative for apnea, cough, chest tightness, shortness of breath, wheezing and stridor.    Cardiovascular: Negative.    Gastrointestinal:         See HPI    Endocrine: Negative.    Genitourinary: Negative.  Negative for difficulty urinating.   Musculoskeletal: Negative.    Skin: Negative.    Allergic/Immunologic: Negative.    Neurological: Negative.    Hematological: Negative.    Psychiatric/Behavioral: Negative.         Objective   Physical Exam  Eyes:      General: Lids are normal.   Neurological:      Mental Status: She is alert and oriented to person, place, and time.   Psychiatric:         Mood and Affect: Mood normal.         Assessment/Plan   Diagnoses and all  orders for this visit:  Esophageal dysphagia  -     Tissue Transglutaminase IgA; Future  -     Esophagogastroduodenoscopy (EGD); Future  Nausea and vomiting, unspecified vomiting type  -     Tissue Transglutaminase IgA; Future  -     Esophagogastroduodenoscopy (EGD); Future  Chronic constipation  -     wheat dextrin (Benefiber Healthy Shape) 5 gram/7.4 gram powder; Take 1 teaspoon daily    18 yo female with a PMH of asthma  who presents today for chronic constipation. She will start fiber daily, discussed healthy bowel habits. She also has a long h/o GERD, regurgitation and intermittent dysphagia.Prior she tried Omeprazole which made her acid reflux worse, she will stop Omeprazole and use famotidine PRN. Complete EGD to r/o EOE, stricture. F/up after.     JANNETTE Canas 12/03/24 12:24 PM

## 2024-12-09 ENCOUNTER — TELEPHONE (OUTPATIENT)
Dept: GASTROENTEROLOGY | Facility: HOSPITAL | Age: 19
End: 2024-12-09

## 2024-12-09 ENCOUNTER — LAB (OUTPATIENT)
Dept: LAB | Facility: LAB | Age: 19
End: 2024-12-09
Payer: COMMERCIAL

## 2024-12-09 DIAGNOSIS — R13.19 ESOPHAGEAL DYSPHAGIA: ICD-10-CM

## 2024-12-09 DIAGNOSIS — R11.2 NAUSEA AND VOMITING, UNSPECIFIED VOMITING TYPE: ICD-10-CM

## 2024-12-09 LAB — TTG IGA SER IA-ACNC: <1 U/ML

## 2024-12-09 PROCEDURE — 36415 COLL VENOUS BLD VENIPUNCTURE: CPT

## 2024-12-09 PROCEDURE — 83516 IMMUNOASSAY NONANTIBODY: CPT

## 2024-12-17 ENCOUNTER — HOSPITAL ENCOUNTER (OUTPATIENT)
Dept: GASTROENTEROLOGY | Facility: HOSPITAL | Age: 19
Discharge: HOME | End: 2024-12-17
Payer: COMMERCIAL

## 2024-12-17 ENCOUNTER — ANESTHESIA (OUTPATIENT)
Dept: GASTROENTEROLOGY | Facility: HOSPITAL | Age: 19
End: 2024-12-17
Payer: COMMERCIAL

## 2024-12-17 ENCOUNTER — ANESTHESIA EVENT (OUTPATIENT)
Dept: GASTROENTEROLOGY | Facility: HOSPITAL | Age: 19
End: 2024-12-17
Payer: COMMERCIAL

## 2024-12-17 VITALS
DIASTOLIC BLOOD PRESSURE: 63 MMHG | HEIGHT: 68 IN | TEMPERATURE: 97.3 F | OXYGEN SATURATION: 100 % | WEIGHT: 165 LBS | BODY MASS INDEX: 25.01 KG/M2 | SYSTOLIC BLOOD PRESSURE: 101 MMHG | RESPIRATION RATE: 16 BRPM | HEART RATE: 84 BPM

## 2024-12-17 DIAGNOSIS — R13.19 ESOPHAGEAL DYSPHAGIA: ICD-10-CM

## 2024-12-17 DIAGNOSIS — R11.2 NAUSEA AND VOMITING, UNSPECIFIED VOMITING TYPE: Primary | ICD-10-CM

## 2024-12-17 LAB — HCG UR QL IA.RAPID: NEGATIVE

## 2024-12-17 PROCEDURE — 3700000002 HC GENERAL ANESTHESIA TIME - EACH INCREMENTAL 1 MINUTE

## 2024-12-17 PROCEDURE — 3700000001 HC GENERAL ANESTHESIA TIME - INITIAL BASE CHARGE

## 2024-12-17 PROCEDURE — 43239 EGD BIOPSY SINGLE/MULTIPLE: CPT | Performed by: INTERNAL MEDICINE

## 2024-12-17 PROCEDURE — 81025 URINE PREGNANCY TEST: CPT | Performed by: INTERNAL MEDICINE

## 2024-12-17 PROCEDURE — 7100000009 HC PHASE TWO TIME - INITIAL BASE CHARGE

## 2024-12-17 PROCEDURE — 7100000010 HC PHASE TWO TIME - EACH INCREMENTAL 1 MINUTE

## 2024-12-17 PROCEDURE — 2500000004 HC RX 250 GENERAL PHARMACY W/ HCPCS (ALT 636 FOR OP/ED)

## 2024-12-17 PROCEDURE — A43239 PR EDG TRANSORAL BIOPSY SINGLE/MULTIPLE: Performed by: NURSE ANESTHETIST, CERTIFIED REGISTERED

## 2024-12-17 RX ORDER — GLYCOPYRROLATE 0.2 MG/ML
INJECTION INTRAMUSCULAR; INTRAVENOUS AS NEEDED
Status: DISCONTINUED | OUTPATIENT
Start: 2024-12-17 | End: 2024-12-17

## 2024-12-17 RX ORDER — MIDAZOLAM HYDROCHLORIDE 1 MG/ML
1 INJECTION, SOLUTION INTRAMUSCULAR; INTRAVENOUS ONCE AS NEEDED
Status: DISCONTINUED | OUTPATIENT
Start: 2024-12-17 | End: 2024-12-18 | Stop reason: HOSPADM

## 2024-12-17 RX ORDER — ACETAMINOPHEN 325 MG/1
975 TABLET ORAL ONCE
Status: DISCONTINUED | OUTPATIENT
Start: 2024-12-17 | End: 2024-12-18 | Stop reason: HOSPADM

## 2024-12-17 RX ORDER — HYDRALAZINE HYDROCHLORIDE 20 MG/ML
5 INJECTION INTRAMUSCULAR; INTRAVENOUS EVERY 30 MIN PRN
Status: DISCONTINUED | OUTPATIENT
Start: 2024-12-17 | End: 2024-12-18 | Stop reason: HOSPADM

## 2024-12-17 RX ORDER — MEPERIDINE HYDROCHLORIDE 50 MG/ML
12.5 INJECTION INTRAMUSCULAR; INTRAVENOUS; SUBCUTANEOUS EVERY 10 MIN PRN
Status: DISCONTINUED | OUTPATIENT
Start: 2024-12-17 | End: 2024-12-18 | Stop reason: HOSPADM

## 2024-12-17 RX ORDER — ALBUTEROL SULFATE 0.83 MG/ML
2.5 SOLUTION RESPIRATORY (INHALATION) ONCE AS NEEDED
Status: DISCONTINUED | OUTPATIENT
Start: 2024-12-17 | End: 2024-12-18 | Stop reason: HOSPADM

## 2024-12-17 RX ORDER — SODIUM CHLORIDE, SODIUM LACTATE, POTASSIUM CHLORIDE, CALCIUM CHLORIDE 600; 310; 30; 20 MG/100ML; MG/100ML; MG/100ML; MG/100ML
100 INJECTION, SOLUTION INTRAVENOUS CONTINUOUS
Status: DISCONTINUED | OUTPATIENT
Start: 2024-12-17 | End: 2024-12-18 | Stop reason: HOSPADM

## 2024-12-17 RX ORDER — ONDANSETRON HYDROCHLORIDE 2 MG/ML
4 INJECTION, SOLUTION INTRAVENOUS ONCE AS NEEDED
Status: DISCONTINUED | OUTPATIENT
Start: 2024-12-17 | End: 2024-12-18 | Stop reason: HOSPADM

## 2024-12-17 RX ORDER — LIDOCAINE HYDROCHLORIDE 20 MG/ML
INJECTION, SOLUTION INFILTRATION; PERINEURAL AS NEEDED
Status: DISCONTINUED | OUTPATIENT
Start: 2024-12-17 | End: 2024-12-17

## 2024-12-17 RX ORDER — OXYCODONE HYDROCHLORIDE 5 MG/1
5 TABLET ORAL EVERY 4 HOURS PRN
Status: DISCONTINUED | OUTPATIENT
Start: 2024-12-17 | End: 2024-12-18 | Stop reason: HOSPADM

## 2024-12-17 RX ORDER — MIDAZOLAM HYDROCHLORIDE 1 MG/ML
INJECTION, SOLUTION INTRAMUSCULAR; INTRAVENOUS AS NEEDED
Status: DISCONTINUED | OUTPATIENT
Start: 2024-12-17 | End: 2024-12-17

## 2024-12-17 RX ORDER — LIDOCAINE HYDROCHLORIDE 10 MG/ML
0.1 INJECTION, SOLUTION INFILTRATION; PERINEURAL ONCE
Status: DISCONTINUED | OUTPATIENT
Start: 2024-12-17 | End: 2024-12-18 | Stop reason: HOSPADM

## 2024-12-17 RX ORDER — PROPOFOL 10 MG/ML
INJECTION, EMULSION INTRAVENOUS CONTINUOUS PRN
Status: DISCONTINUED | OUTPATIENT
Start: 2024-12-17 | End: 2024-12-17

## 2024-12-17 RX ORDER — FENTANYL CITRATE 50 UG/ML
12.5 INJECTION, SOLUTION INTRAMUSCULAR; INTRAVENOUS EVERY 5 MIN PRN
Status: DISCONTINUED | OUTPATIENT
Start: 2024-12-17 | End: 2024-12-18 | Stop reason: HOSPADM

## 2024-12-17 RX ORDER — HYDROMORPHONE HYDROCHLORIDE 1 MG/ML
1 INJECTION, SOLUTION INTRAMUSCULAR; INTRAVENOUS; SUBCUTANEOUS EVERY 5 MIN PRN
Status: DISCONTINUED | OUTPATIENT
Start: 2024-12-17 | End: 2024-12-18 | Stop reason: HOSPADM

## 2024-12-17 RX ORDER — DIPHENHYDRAMINE HYDROCHLORIDE 50 MG/ML
12.5 INJECTION INTRAMUSCULAR; INTRAVENOUS ONCE AS NEEDED
Status: DISCONTINUED | OUTPATIENT
Start: 2024-12-17 | End: 2024-12-18 | Stop reason: HOSPADM

## 2024-12-17 ASSESSMENT — PAIN - FUNCTIONAL ASSESSMENT
PAIN_FUNCTIONAL_ASSESSMENT: 0-10

## 2024-12-17 ASSESSMENT — PAIN SCALES - GENERAL
PAINLEVEL_OUTOF10: 0 - NO PAIN
PAINLEVEL_OUTOF10: 0 - NO PAIN
PAIN_LEVEL: 0
PAINLEVEL_OUTOF10: 0 - NO PAIN

## 2024-12-17 NOTE — ANESTHESIA PREPROCEDURE EVALUATION
Patient: Che Adams    Procedure Information       Date/Time: 12/17/24 0830    Scheduled providers: Basil Spence MD    Procedure: EGD    [place] [facility] [place] Problems   Pulmonary   (+) Mild intermittent asthma      GI   (+) Esophageal dysphagia      Musculoskeletal   (+) Mild scoliosis      Skin   (+) Rash and other nonspecific skin eruption      GYN   (+) Abnormal uterine bleeding       Clinical information reviewed:   Tobacco  Allergies  Meds   Med Hx  Surg Hx   Fam Hx  Soc Hx        NPO Detail:  NPO/Void Status  Date of Last Liquid: 12/16/24  Time of Last Liquid: 2330  Date of Last Solid: 12/16/24  Time of Last Solid: 1930  Time of Last Void: 0716         Physical Exam    Airway  Mallampati: II  TM distance: >3 FB  Neck ROM: full     Cardiovascular - normal exam     Dental    Pulmonary - normal exam     Abdominal - normal exam             Anesthesia Plan    History of general anesthesia?: yes  History of complications of general anesthesia?: no    ASA 2     MAC     intravenous induction   Anesthetic plan and risks discussed with patient.    Plan discussed with CRNA.

## 2024-12-17 NOTE — ANESTHESIA POSTPROCEDURE EVALUATION
Patient: Che Adams    Procedure Summary       Date: 12/17/24 Room / Location: [facility]    Anesthesia Start: 0826 Anesthesia Stop: 0843    Procedure: EGD Diagnosis:       Nausea and vomiting, unspecified vomiting type      Esophageal dysphagia      Nausea and vomiting, unspecified vomiting type    Scheduled Providers: Basil Spence MD Responsible Provider: NUNU Shepherd-JESSICA, DNP    Anesthesia Type: MAC ASA Status: 2            Anesthesia Type: MAC    Vitals Value Taken Time   /67 12/17/24 0843   Temp 36 12/17/24 0843   Pulse 78 12/17/24 0843   Resp 16 12/17/24 0843   SpO2 97 12/17/24 0843       Anesthesia Post Evaluation    Patient location during evaluation: PACU  Patient participation: complete - patient participated  Level of consciousness: sleepy but conscious and awake  Pain score: 0  Pain management: adequate  Airway patency: patent  Cardiovascular status: acceptable  Respiratory status: acceptable  Hydration status: acceptable  Postoperative Nausea and Vomiting: none        There were no known notable events for this encounter.

## 2024-12-17 NOTE — DISCHARGE INSTRUCTIONS

## 2024-12-30 LAB
LABORATORY COMMENT REPORT: NORMAL
PATH REPORT.FINAL DX SPEC: NORMAL
PATH REPORT.GROSS SPEC: NORMAL
PATH REPORT.RELEVANT HX SPEC: NORMAL
PATH REPORT.TOTAL CANCER: NORMAL

## 2025-01-22 NOTE — RESULT ENCOUNTER NOTE
During recent upper endoscopy biopsies were taken from the food pipe.  Pathology results are normal which means no evidence of eosinophilic esophagitis.  If you continue to have symptoms neck step would be esophageal motility study or possibly pH/impedance study.  Please follow-up in GI office to see Kendy Valderrama if you continue to have symptoms.  Sincerely,

## 2025-04-07 ENCOUNTER — APPOINTMENT (OUTPATIENT)
Dept: OBSTETRICS AND GYNECOLOGY | Facility: CLINIC | Age: 20
End: 2025-04-07
Payer: COMMERCIAL

## 2025-04-07 VITALS
DIASTOLIC BLOOD PRESSURE: 64 MMHG | HEIGHT: 68 IN | BODY MASS INDEX: 24.71 KG/M2 | WEIGHT: 163 LBS | SYSTOLIC BLOOD PRESSURE: 120 MMHG

## 2025-04-07 DIAGNOSIS — N93.9 ABNORMAL UTERINE BLEEDING: Primary | ICD-10-CM

## 2025-04-07 PROCEDURE — 99395 PREV VISIT EST AGE 18-39: CPT | Performed by: STUDENT IN AN ORGANIZED HEALTH CARE EDUCATION/TRAINING PROGRAM

## 2025-04-07 PROCEDURE — 1036F TOBACCO NON-USER: CPT | Performed by: STUDENT IN AN ORGANIZED HEALTH CARE EDUCATION/TRAINING PROGRAM

## 2025-04-07 PROCEDURE — 3008F BODY MASS INDEX DOCD: CPT | Performed by: STUDENT IN AN ORGANIZED HEALTH CARE EDUCATION/TRAINING PROGRAM

## 2025-04-07 PROCEDURE — 99213 OFFICE O/P EST LOW 20 MIN: CPT | Performed by: STUDENT IN AN ORGANIZED HEALTH CARE EDUCATION/TRAINING PROGRAM

## 2025-04-07 ASSESSMENT — PAIN SCALES - GENERAL: PAINLEVEL_OUTOF10: 0-NO PAIN

## 2025-04-07 NOTE — ASSESSMENT & PLAN NOTE
Very happy with Sneha  Reviewed [place] diagnostic criteria for PCOS. Patient denies clinical signs of hirsutism, periods also previously monthly. TVUS 2023 report reviewed - no polycystic ovaries. No evidence of metabolic syndrome. Low concern for PCOS at this time. Reviewed that if anything, she may have endometriosis. Given good symptom control, no further work up or intervention required.  Has done well without OCPs - wishes to continue without them  No other concerns - continue with liletta alone, to contact clinic if she desires OCP refill at any point

## 2025-04-07 NOTE — PROGRESS NOTES
" Che Adams is a 20 y.o.  female who is here for a routine exam.   PCP = Federica Young, NUNU-CNP    Subjective     Concerns today: Has not taken OCP since January - only occasional spotting for a couple days. No cramping or other bleeding. Cannot feel string.    Concerned about PCOS - recently friend was diagnosed and wants to know more.    OB/Gyn History:  Menstrual cycle concerns: none  Sexual Health Concerns: none  Current contraception: Liletta    Preventative:  Mammogram: age 38   Last Colonoscopy: age 43   DM Screening: n/a  HPV vaccination: s/p 2014 x 3  Exercise: active lifestyle, no particular  Diet: no particular  Seat Belt Use: always    Social History:  Living Situation: lives with Mom, dad, brother and sister - moving to [place] this year  School/Employment: works at a   Safe at Home?: Yes  Depression Screen/Mood concerns: No    Substance use updated in chart.  Family history (specifically breast, ovarian, colon cancer) reviewed and updated in chart.   Personal medical and surgical history reviewed and updated in chart.   Obstetric & Gynecologic history reviewed and updated in chart.  Pap history reviewed and updated in chart.         Objective   /64   Ht 1.727 m (5' 8\")   Wt 73.9 kg (163 lb)   BMI 24.78 kg/m²   Physical Exam  Constitutional:       General: She is not in acute distress.     Appearance: Normal appearance.   HENT:      Head: Normocephalic.   Cardiovascular:      Rate and Rhythm: Normal rate.   Pulmonary:      Effort: Pulmonary effort is normal. No respiratory distress.   Skin:     General: Skin is warm and dry.   Neurological:      Mental Status: She is alert and oriented to person, place, and time.   Psychiatric:         Mood and Affect: Mood normal.         Behavior: Behavior normal.         Thought Content: Thought content normal.         Judgment: Judgment normal.      Declines pelvic exam today - wishes to schedule additional visit for pap.   "     Assessment/Plan      Che Adams is a 20 y.o.  female presenting today for annual exam with the following problems addressed:    Problem List Items Addressed This Visit       Abnormal uterine bleeding - Primary     Very happy with Sneha  Reviewed [place] diagnostic criteria for PCOS. Patient denies clinical signs of hirsutism, periods also previously monthly. TVUS  report reviewed - no polycystic ovaries. No evidence of metabolic syndrome. Low concern for PCOS at this time. Reviewed that if anything, she may have endometriosis. Given good symptom control, no further work up or intervention required.  Has done well without OCPs - wishes to continue without them  No other concerns - continue with liletta alone, to contact clinic if she desires OCP refill at any point            Healthcare Maintenance:  - Reviewed healthy lifestyle including well rounded diet and exercise (moderate intensity 150min/week; high intensity 75min/week)  - Immunizations: UTD  - Pap due - counseled on role and importance, wishes to make follow up for exam  - STI testing declined                Danis Zaidi MD 25 3:47 PM

## 2025-08-11 ENCOUNTER — OFFICE VISIT (OUTPATIENT)
Dept: PRIMARY CARE | Facility: CLINIC | Age: 20
End: 2025-08-11
Payer: COMMERCIAL

## 2025-08-11 DIAGNOSIS — Z00.00 HEALTHCARE MAINTENANCE: ICD-10-CM

## 2025-11-17 ENCOUNTER — APPOINTMENT (OUTPATIENT)
Dept: PRIMARY CARE | Facility: CLINIC | Age: 20
End: 2025-11-17
Payer: COMMERCIAL